# Patient Record
Sex: MALE | Race: BLACK OR AFRICAN AMERICAN
[De-identification: names, ages, dates, MRNs, and addresses within clinical notes are randomized per-mention and may not be internally consistent; named-entity substitution may affect disease eponyms.]

---

## 2019-04-04 ENCOUNTER — HOSPITAL ENCOUNTER (EMERGENCY)
Dept: HOSPITAL 80 - CED | Age: 34
Discharge: HOME | End: 2019-04-04
Payer: MEDICAID

## 2019-04-04 VITALS — DIASTOLIC BLOOD PRESSURE: 82 MMHG | SYSTOLIC BLOOD PRESSURE: 139 MMHG

## 2019-04-04 DIAGNOSIS — K08.89: Primary | ICD-10-CM

## 2023-07-06 NOTE — EDPHY
H & P


Time Seen by Provider: 04/04/19 19:56


HPI/ROS: 





35 yo M presents complaining of dental pain at the site where he had his wisdom 

teeth removed approximately 2 weeks ago.


He states was quite painful at the time he went home and was not placed on 

antibiotics at that time.  He then went to [place] for a week where he 

continued to have pain and presents today after his dentist appointment was 

canceled earlier today.


He is able to swallow and eat but continues to have pain where his teeth were 

removed.


He has no difficulty speaking or opening his mouth.  He denies fevers or chills.








Review of systems


As per HPI


General no fever no chills no weakness


HEENT no eye pain no eye discharge. No eye redness, no sore throat


Respiratory no cough, no shortness of breath


Cardiac no chest pain, no peripheral edema


GI no abdominal pain, no diarrhea, no constipation, no nausea, no vomiting


  no flank pain, no hematuria, no dysuria


Musculoskeletal no myalgias, no joint pain


Heme  no easy bruising, no easy bleeding


Endo no polyuria, no polydipsia


Skin no rashes, no pruritus


Neuro no syncope, no dizziness, no headaches


Psych is no suicidal ideation, no homicidal ideation





Past Medical/Surgical History: 





Non contributory


Social History: 





Lives with family


Denies excessive alcohol or drug use


Does use medical marijuana


Smoking Status: Former smoker


Physical Exam: 





34-year-old male alert and oriented no acute distress nontoxic appearance, 

afebrile


Atraumatic normocephalic


Oropharynx-no erythema, no trismus, no tongue swelling or elevation


Gums appear well healed at site of wisdom tooth extraction bilaterally


No erythema, no swelling, no drainage


No preauricular adenopathy


Neck supple, no lymphadenopathy


Lungs clear to auscultation, no respiratory distress


Heart regular rate and rhythm


Skin no rash


Constitutional: 


 Initial Vital Signs











Temperature (C)  36.7 C   04/04/19 20:10


 


Heart Rate  92   04/04/19 20:10


 


Respiratory Rate  16   04/04/19 20:10


 


Blood Pressure  139/82 H  04/04/19 20:10


 


O2 Sat (%)  99   04/04/19 20:10








 











O2 Delivery Mode               Room Air














Allergies/Adverse Reactions: 


 





Sulfa (Sulfonamide Antibiotics) Allergy (Verified 04/04/19 20:07)


 








Home Medications: 














 Medication  Instructions  Recorded


 


Penicillin V Potassium [Penicillin 500 mg PO QID 8 Days #32 tablet 04/04/19





VK]  














Medical Decision Making


ED Course/Re-evaluation: 





Patient seen and evaluated for pain at site of dental extraction





Patient examined





Impression/plan





Post dental extraction pain


Cannot rule out a deeper infection a in socket





Patient to see dentist as soon as possible


Will start patient on penicillin  four times daily





Differential Diagnosis: 





Differential diagnosis considered but not limited to:


Dental abscess, post extraction pain, postoperative pain, dry socket, 

periapical abscess 





- Data Points


Medications Given: 


 








Discontinued Medications





Penicillin V Potassium (Pen Vk 250 Mg Prepack#6)  1 btl TAKEHOME EDNOW ONE


   PRN Reason: Protocol


   Stop: 04/04/19 20:19


   Last Admin: 04/04/19 20:27 Dose:  1 btl








Departure





- Departure


Disposition: Home, Routine, Self-Care


Clinical Impression: 


 H/O wisdom tooth extraction, Pain, dental





Condition: Good


Instructions:  Toothache (ED)


Referrals: 


[facility] [Other] - As per Instructions


Prescriptions: 


Penicillin V Potassium [Penicillin VK] 500 mg PO QID 8 Days #32 tablet
OB/GYN